# Patient Record
Sex: FEMALE | Race: OTHER | HISPANIC OR LATINO | URBAN - METROPOLITAN AREA
[De-identification: names, ages, dates, MRNs, and addresses within clinical notes are randomized per-mention and may not be internally consistent; named-entity substitution may affect disease eponyms.]

---

## 2022-05-17 ENCOUNTER — EMERGENCY (EMERGENCY)
Facility: HOSPITAL | Age: 52
LOS: 0 days | Discharge: HOME | End: 2022-05-18
Attending: EMERGENCY MEDICINE | Admitting: EMERGENCY MEDICINE
Payer: MEDICAID

## 2022-05-17 VITALS
DIASTOLIC BLOOD PRESSURE: 83 MMHG | TEMPERATURE: 98 F | RESPIRATION RATE: 18 BRPM | HEART RATE: 92 BPM | SYSTOLIC BLOOD PRESSURE: 136 MMHG | OXYGEN SATURATION: 98 % | WEIGHT: 138.01 LBS
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
## 2022-05-17 DIAGNOSIS — S62.614A DISPLACED FRACTURE OF PROXIMAL PHALANX OF RIGHT RING FINGER, INITIAL ENCOUNTER FOR CLOSED FRACTURE: ICD-10-CM

## 2022-05-17 DIAGNOSIS — S80.212A ABRASION, LEFT KNEE, INITIAL ENCOUNTER: ICD-10-CM
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
## 2022-05-17 DIAGNOSIS — M25.562 PAIN IN LEFT KNEE: ICD-10-CM

## 2022-05-17 DIAGNOSIS — M79.641 PAIN IN RIGHT HAND: ICD-10-CM

## 2022-05-17 DIAGNOSIS — S60.041A CONTUSION OF RIGHT RING FINGER WITHOUT DAMAGE TO NAIL, INITIAL ENCOUNTER: ICD-10-CM

## 2022-05-17 DIAGNOSIS — Z23 ENCOUNTER FOR IMMUNIZATION: ICD-10-CM
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
## 2022-05-17 DIAGNOSIS — S62.616A DISPLACED FRACTURE OF PROXIMAL PHALANX OF RIGHT LITTLE FINGER, INITIAL ENCOUNTER FOR CLOSED FRACTURE: ICD-10-CM
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
## 2022-05-17 DIAGNOSIS — S60.051A CONTUSION OF RIGHT LITTLE FINGER WITHOUT DAMAGE TO NAIL, INITIAL ENCOUNTER: ICD-10-CM
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
## 2022-05-17 PROCEDURE — 73130 X-RAY EXAM OF HAND: CPT | Mod: 26,RT

## 2022-05-17 PROCEDURE — 73562 X-RAY EXAM OF KNEE 3: CPT | Mod: 26,LT

## 2022-05-17 PROCEDURE — 26725 TREAT FINGER FRACTURE EACH: CPT | Mod: 54

## 2022-05-17 PROCEDURE — 99284 EMERGENCY DEPT VISIT MOD MDM: CPT | Mod: 57

## 2022-05-17 RX ORDER — OXYCODONE AND ACETAMINOPHEN 5; 325 MG/1; MG/1
1 TABLET ORAL ONCE
Refills: 0 | Status: DISCONTINUED | OUTPATIENT
Start: 2022-05-17 | End: 2022-05-17

## 2022-05-17 RX ORDER — TETANUS TOXOID, REDUCED DIPHTHERIA TOXOID AND ACELLULAR PERTUSSIS VACCINE, ADSORBED 5; 2.5; 8; 8; 2.5 [IU]/.5ML; [IU]/.5ML; UG/.5ML; UG/.5ML; UG/.5ML
0.5 SUSPENSION INTRAMUSCULAR ONCE
Refills: 0 | Status: COMPLETED | OUTPATIENT
Start: 2022-05-17 | End: 2022-05-17

## 2022-05-17 RX ORDER — IBUPROFEN 200 MG
600 TABLET ORAL ONCE
Refills: 0 | Status: COMPLETED | OUTPATIENT
Start: 2022-05-17 | End: 2022-05-17

## 2022-05-17 RX ADMIN — Medication 600 MILLIGRAM(S): at 21:34

## 2022-05-17 RX ADMIN — OXYCODONE AND ACETAMINOPHEN 1 TABLET(S): 5; 325 TABLET ORAL at 22:54

## 2022-05-17 RX ADMIN — TETANUS TOXOID, REDUCED DIPHTHERIA TOXOID AND ACELLULAR PERTUSSIS VACCINE, ADSORBED 0.5 MILLILITER(S): 5; 2.5; 8; 8; 2.5 SUSPENSION INTRAMUSCULAR at 21:30

## 2022-05-18 PROCEDURE — 73130 X-RAY EXAM OF HAND: CPT | Mod: 26,RT

## 2022-05-18 RX ORDER — CEPHALEXIN 500 MG
1 CAPSULE ORAL
Qty: 28 | Refills: 0
Start: 2022-05-18 | End: 2022-05-24

## 2022-05-18 RX ORDER — CEPHALEXIN 500 MG
500 CAPSULE ORAL ONCE
Refills: 0 | Status: COMPLETED | OUTPATIENT
Start: 2022-05-18 | End: 2022-05-18

## 2022-05-18 RX ORDER — IBUPROFEN 200 MG
1 TABLET ORAL
Qty: 12 | Refills: 0
Start: 2022-05-18 | End: 2022-05-21

## 2022-05-18 RX ADMIN — Medication 500 MILLIGRAM(S): at 01:08

## 2022-05-18 NOTE — ED PROVIDER NOTE - NS ED ROS FT
Constitutional: (-) fever, (-) chills  Eyes: (-) visual changes  Cardiovascular: (-) chest pain, (-) syncope  Respiratory: (-) cough, (-) shortness of breath, (-) dyspnea,   Gastrointestinal: (-) vomiting, (-) diarrhea, (-)nausea,  Musculoskeletal: (-) neck pain, (-) back pain, (+) joint pain,  Integumentary: (-) rash, (-) edema, (-) bruises  Neurological: (-) headache, (-) loc, (-) dizziness, (-) tingling, (-)numbness,  Peripheral Vascular: (-) leg swelling  Allergic/Immunologic: (-) pruritus

## 2022-05-18 NOTE — ED PROVIDER NOTE - PATIENT PORTAL LINK FT
You can access the FollowMyHealth Patient Portal offered by [facility] by registering at the following website: [email or web address]. By joining [facility]’s FollowMyHealth portal, you will also be able to view your health information using other applications (apps) compatible with our system.

## 2022-05-18 NOTE — ED PROVIDER NOTE - OBJECTIVE STATEMENT
50 y/o F with no pmhx presents to the ED s/p mechanical trip and fall. Pt states that she was walking her dogs, tripped on a  and landed on her outstretched hands. Denies any head injury, LOC or nausea/vision changes since. Pt currently in the ED c/o R hand pain and L knee pain. Denies any other injury. No chest pain, ABD pain, back pain or numbness/weakness/tingling in the extremities.

## 2022-05-18 NOTE — ED PROCEDURE NOTE - NS ED PERI VASCULAR NEG
Health Maintenance Due   Topic Date Due   • Breast Cancer Screening  03/11/2020       Patient is due for the topics as listed above and wishes to proceed with them. Orders placed for Mammogram.        
fingers/toes warm to touch/no paresthesia/no swelling/no cyanosis of extremity/capillary refill time < 2 seconds

## 2022-05-18 NOTE — ED PROVIDER NOTE - PHYSICAL EXAMINATION
Physical Exam  Vital Signs: I have reviewed the initial vital signs.  Constitutional: Appears uncomfortable in ED chair, NAD.   Eyes: Conjunctiva pink, Sclera clear  Cardiovascular: S1 and S2, regular rate, regular rhythm, well-perfused extremities, radial pulses equal and 2+  Respiratory: unlabored respiratory effort, clear to auscultation bilaterally no wheezing, rales and rhonchi  Gastrointestinal: soft, non-tender abdomen, no pulsatile mass, normal bowl sounds  Musculoskeletal: supple neck, (+) R hand tenderness with ecchymosis & swelling 4th and 5th bases, + deformity. (+) Superficial abrasion to L knee, no edema.   Integumentary: warm, dry, no rash  Neurologic: awake, alert, motor and sensory functions grossly intact  Psychiatric: appropriate mood, appropriate affect

## 2022-05-18 NOTE — ED PROVIDER NOTE - NSFOLLOWUPINSTRUCTIONS_ED_ALL_ED_FT
Follow up with hand/orthopedic clinic in 1-2 days     Finger Fracture    WHAT YOU NEED TO KNOW:    A finger fracture is a break in 1 or more of the bones in your finger.     DISCHARGE INSTRUCTIONS:    Return to the emergency department if:     Your cast or splint gets wet, damaged, or comes off.      Your splint or cast feels too tight.      You have severe pain.      Your injured finger is numb, cold, or pale.    Contact your healthcare provider or hand specialist if:     Your pain or swelling gets worse, even after treatment.      You have questions or concerns about your condition or care.    Medicines:     NSAIDs, such as ibuprofen, help decrease swelling, pain, and fever. This medicine is available with or without a doctor's order. NSAIDs can cause stomach bleeding or kidney problems in certain people. If you take blood thinner medicine, always ask your healthcare provider if NSAIDs are safe for you. Always read the medicine label and follow directions.      Acetaminophen decreases pain and fever. It is available without a doctor's order. Ask how much to take and how often to take it. Follow directions. Acetaminophen can cause liver damage if not taken correctly.      Prescription pain medicine may be given. Ask your healthcare provider how to take this medicine safely. Some prescription pain medicines contain acetaminophen. Do not take other medicines that contain acetaminophen without talking to your healthcare provider. Too much acetaminophen may cause liver damage. Prescription pain medicine may cause constipation. Ask your healthcare provider how to prevent or treat constipation.       Take your medicine as directed. Contact your healthcare provider if you think your medicine is not helping or if you have side effects. Tell him or her if you are allergic to any medicine. Keep a list of the medicines, vitamins, and herbs you take. Include the amounts, and when and why you take them. Bring the list or the pill bottles to follow-up visits. Carry your medicine list with you in case of an emergency.    Self-care:     Wear your splint as directed. Do not remove your splint until you follow up with your healthcare provider or hand specialist.       Apply ice on your finger for 15 to 20 minutes every hour or as directed. Use an ice pack, or put crushed ice in a plastic bag. Cover it with a towel before you apply it to your skin. Ice helps prevent tissue damage and decreases swelling and pain.      Elevate your finger above the level of your heart as often as you can. This will help decrease swelling and pain. Prop your hand on pillows or blankets to keep it elevated comfortably.       Go to physical therapy as directed. A physical therapist teaches you exercises to help improve movement and strength, and to decrease pain.     Follow up with your healthcare provider or hand specialist within 2 days: Write down your questions so you remember to ask them during your visits.        © Copyright [employer] 2019 All illustrations and images included in CareNotes are the copyrighted property of [employer] or [employer].    Contusion in Adults    WHAT YOU NEED TO KNOW:    A contusion is a bruise that appears on your skin after an injury. A bruise happens when small blood vessels tear but skin does not. When blood vessels tear, blood leaks into nearby tissue, such as soft tissue or muscle.    DISCHARGE INSTRUCTIONS:    Return to the emergency department if:     You have new trouble moving the injured area.      You have tingling or numbness in or near the injured area.      Your hand or foot below the bruise gets cold or turns pale.     Contact your healthcare provider if:     You find a new lump in the injured area.      Your symptoms do not improve with treatment after 4 to 5 days.      You have questions or concerns about your condition or care.    Medicines: You may need any of the following:     NSAIDs help decrease swelling and pain or fever. This medicine is available with or without a doctor's order. NSAIDs can cause stomach bleeding or kidney problems in certain people. If you take blood thinner medicine, always ask your healthcare provider if NSAIDs are safe for you. Always read the medicine label and follow directions.      Prescription pain medicine may be given. Do not wait until the pain is severe before you take your medicine.      Take your medicine as directed. Contact your healthcare provider if you think your medicine is not helping or if you have side effects. Tell him of her if you are allergic to any medicine. Keep a list of the medicines, vitamins, and herbs you take. Include the amounts, and when and why you take them. Bring the list or the pill bottles to follow-up visits. Carry your medicine list with you in case of an emergency.    Follow up with your healthcare provider as directed: You may need to return within a week to check your injury again. Write down your questions so you remember to ask them during your visits.    Help a contusion heal:     Rest the injured area or use it less than usual. If you bruised your leg or foot, you may need crutches or a cane to help you walk. This will help you keep weight off your injured body part.       Apply ice to decrease swelling and pain. Ice may also help prevent tissue damage. Use an ice pack, or put crushed ice in a plastic bag. Cover it with a towel and place it on your bruise for 15 to 20 minutes every hour or as directed.      Use compression to support the area and decrease swelling. Wrap an elastic bandage around the area over the bruised muscle. Make sure the bandage is not too tight. You should be able to fit 1 finger between the bandage and your skin.      Elevate (raise) your injured body part above the level of your heart to help decrease pain and swelling. Use pillows, blankets, or rolled towels to elevate the area as often as you can.      Do not drink alcohol as directed. Alcohol may slow healing.      Do not stretch injured muscles right after your injury. Ask your healthcare provider when and how you may safely stretch after your injury. Gentle stretches can help increase your flexibility.      Do not massage the area or put heating pads on the bruise right after your injury. Heat and massage may slow healing. Your healthcare provider may tell you to apply heat after several days. At that time, heat will start to help the injury heal.    Prevent another contusion:     Stretch and warm up before you play sports or exercise.      Wear protective gear when you play sports. Examples are shin guards and padding.       If you begin a new physical activity, start slowly to give your body a chance to adjust.         © Copyright [employer] 2019 All illustrations and images included in CareNotes are the copyrighted property of [email or web address] or [employer].       Abrasion  ImageAn abrasion is a cut or a scrape on the outer surface of the skin. An abrasion does not go through all the layers of the skin. It is important to care for your abrasion properly to prevent infection.    What are the causes?  This condition is caused by falling on or gliding across the ground or another surface. When your skin rubs on something, the outer and inner layers of skin may rub off.    What are the signs or symptoms?  The main symptom of this condition is a cut or a scrape. The scrape may be bleeding, or it may appear red or pink. If the abrasion was caused by a fall, there may be a bruise under the cut or scrape.    How is this diagnosed?  An abrasion is diagnosed with a physical exam.    How is this treated?  Treatment for this condition depends on how large and deep the abrasion is. In most cases:    Your abrasion will be cleaned with water and mild soap. This is done to remove any dirt or debris (such as particles of glass or rock) that may be stuck in the wound.  An antibiotic ointment may be applied to the abrasion to help prevent infection.  A bandage (dressing) may be placed on the abrasion to keep it clean.    You may also need a tetanus shot.    Follow these instructions at home:  Medicines     Take or apply over-the-counter and prescription medicines only as told by your health care provider.  If you were prescribed an antibiotic medicine, apply it as told by your health care provider.  Wound care     Clean the wound 2–3 times a day, or as directed by your health care provider. To do this, wash the wound with mild soap and water, rinse off the soap, and pat the wound dry with a clean towel. Do not rub the wound.  Keep the dressing clean and dry as told by your health care provider.  There are many different ways to close and cover a wound. Follow instructions from your health care provider about:    Caring for your wound.  Changing and removing your dressing. You may have to change your dressing one or more times a day, or as directed by your health care provider.    Check your wound every day for signs of infection. Check for:    Redness, particularly a red streak that spreads out from the wound.  Swelling or increased pain.  Warmth.  Fluid, pus, or a bad smell.    If directed, put ice on the injured area to reduce pain and swelling:    Put ice in a plastic bag.   Place a towel between your skin and the bag.   Leave the ice on for 20 minutes, 2–3 times a day.    General instructions     Do not take baths, swim, or use a hot tub until your health care provider says it is okay to do so.  If possible, raise (elevate) the injured area above the level of your heart while you are sitting or lying down. This will reduce pain and swelling.  Keep all follow-up visits as directed by your health care provider. This is important.  Contact a health care provider if:  You received a tetanus shot, and you have swelling, severe pain, redness, or bleeding at the injection site.  Your pain is not controlled with medicine.  You have redness, swelling, or more pain at the site of your wound.  Get help right away if:  You have a red streak spreading away from your wound.  You have a fever.  You have fluid, blood, or pus coming from your wound.  You notice a bad smell coming from your wound or your dressing.  Summary  An abrasion is a cut or a scrape on the outer surface of the skin. An abrasion does not go through all the layers of the skin.  Care for your abrasion properly to prevent infection.  Clean the wound with mild soap and water 2–3 times a day. Follow instructions from your health care provider about taking medicines and changing your bandage (dressing).  Contact your health care provider if you have redness, swelling or more pain in the wound area.  Get help right away if you have a fever or if you have fluid, blood, pus, a bad smell, or a red streak coming from the wound.  This information is not intended to replace advice given to you by your health care provider. Make sure you discuss any questions you have with your health care provider.

## 2022-05-18 NOTE — ED PROVIDER NOTE - NSFOLLOWUPCLINICS_GEN_ALL_ED_FT
[facility]  Hand  [address], [address]  [place] [address]  Phone: [phone]  Fax:     [facility]  Orthpedic  [address], [place]   Phone: [phone]  Fax:

## 2022-05-18 NOTE — ED PROVIDER NOTE - CLINICAL SUMMARY MEDICAL DECISION MAKING FREE TEXT BOX
51-year-old female right-hand-dominant presents status post trip and fall onto her hands and knees no close head injury ambulating afterwards patient with obvious deformity to right hand fourth and fifth digits MVI x-ray multiple fractures dislocation.  Digital block administered fracture reduced and splinted repeat x-ray with improved positioning of fracture patient was then she was follow-up with outpatient hand for hand surgery  Patient to be discharged from ED in well appearing condition. Any available test results were discussed with and printed  for patient.  Verbal instructions given, including instructions to return to ED immediately for any new, worsening, or concerning symptoms. Limitations of ED work up discussed.  Patient reports understanding of above with capacity and insight. Written discharge instructions additionally given, including follow-up plan.

## 2022-05-18 NOTE — ED PROVIDER NOTE - CARE PLAN
1 Principal Discharge DX:	Finger fracture  Secondary Diagnosis:	Knee contusion  Secondary Diagnosis:	Abrasion  Secondary Diagnosis:	Fall

## 2022-05-18 NOTE — ED PROVIDER NOTE - NS ED ATTENDING STATEMENT MOD
This was a shared visit with the ESTELLA. I reviewed and verified the documentation and independently performed the documented:

## 2022-06-02 ENCOUNTER — OUTPATIENT (OUTPATIENT)
Dept: OUTPATIENT SERVICES | Facility: HOSPITAL | Age: 52
LOS: 1 days | Discharge: HOME | End: 2022-06-02
Payer: SUBSIDIZED

## 2022-06-02 VITALS
DIASTOLIC BLOOD PRESSURE: 63 MMHG | HEIGHT: 60 IN | RESPIRATION RATE: 14 BRPM | HEART RATE: 64 BPM | OXYGEN SATURATION: 99 % | TEMPERATURE: 97 F | SYSTOLIC BLOOD PRESSURE: 113 MMHG | WEIGHT: 142.86 LBS

## 2022-06-02 DIAGNOSIS — Z01.818 ENCOUNTER FOR OTHER PREPROCEDURAL EXAMINATION: ICD-10-CM

## 2022-06-02 DIAGNOSIS — S62.616D DISPLACED FRACTURE OF PROXIMAL PHALANX OF RIGHT LITTLE FINGER, SUBSEQUENT ENCOUNTER FOR FRACTURE WITH ROUTINE HEALING: ICD-10-CM

## 2022-06-02 LAB
ALBUMIN SERPL ELPH-MCNC: 4.9 G/DL — SIGNIFICANT CHANGE UP (ref 3.5–5.2)
ALP SERPL-CCNC: 96 U/L — SIGNIFICANT CHANGE UP (ref 30–115)
ALT FLD-CCNC: 14 U/L — SIGNIFICANT CHANGE UP (ref 0–41)
ANION GAP SERPL CALC-SCNC: 15 MMOL/L — HIGH (ref 7–14)
APPEARANCE UR: CLEAR — SIGNIFICANT CHANGE UP
APTT BLD: 30.7 SEC — SIGNIFICANT CHANGE UP (ref 27–39.2)
AST SERPL-CCNC: 21 U/L — SIGNIFICANT CHANGE UP (ref 0–41)
BASOPHILS # BLD AUTO: 0.06 K/UL — SIGNIFICANT CHANGE UP (ref 0–0.2)
BASOPHILS NFR BLD AUTO: 0.7 % — SIGNIFICANT CHANGE UP (ref 0–1)
BILIRUB SERPL-MCNC: 0.3 MG/DL — SIGNIFICANT CHANGE UP (ref 0.2–1.2)
BILIRUB UR-MCNC: NEGATIVE — SIGNIFICANT CHANGE UP
BUN SERPL-MCNC: 17 MG/DL — SIGNIFICANT CHANGE UP (ref 10–20)
CALCIUM SERPL-MCNC: 9.6 MG/DL — SIGNIFICANT CHANGE UP (ref 8.5–10.1)
CHLORIDE SERPL-SCNC: 103 MMOL/L — SIGNIFICANT CHANGE UP (ref 98–110)
CO2 SERPL-SCNC: 25 MMOL/L — SIGNIFICANT CHANGE UP (ref 17–32)
COLOR SPEC: YELLOW — SIGNIFICANT CHANGE UP
CREAT SERPL-MCNC: 0.8 MG/DL — SIGNIFICANT CHANGE UP (ref 0.7–1.5)
DIFF PNL FLD: ABNORMAL
EGFR: 89 ML/MIN/1.73M2 — SIGNIFICANT CHANGE UP
EOSINOPHIL # BLD AUTO: 0.22 K/UL — SIGNIFICANT CHANGE UP (ref 0–0.7)
EOSINOPHIL NFR BLD AUTO: 2.6 % — SIGNIFICANT CHANGE UP (ref 0–8)
GLUCOSE SERPL-MCNC: 94 MG/DL — SIGNIFICANT CHANGE UP (ref 70–99)
GLUCOSE UR QL: NEGATIVE — SIGNIFICANT CHANGE UP
HCT VFR BLD CALC: 40.7 % — SIGNIFICANT CHANGE UP (ref 37–47)
HGB BLD-MCNC: 13.5 G/DL — SIGNIFICANT CHANGE UP (ref 12–16)
IMM GRANULOCYTES NFR BLD AUTO: 0.2 % — SIGNIFICANT CHANGE UP (ref 0.1–0.3)
INR BLD: 0.98 RATIO — SIGNIFICANT CHANGE UP (ref 0.65–1.3)
KETONES UR-MCNC: SIGNIFICANT CHANGE UP
LEUKOCYTE ESTERASE UR-ACNC: ABNORMAL
LYMPHOCYTES # BLD AUTO: 3.52 K/UL — HIGH (ref 1.2–3.4)
LYMPHOCYTES # BLD AUTO: 41.8 % — SIGNIFICANT CHANGE UP (ref 20.5–51.1)
MCHC RBC-ENTMCNC: 29.2 PG — SIGNIFICANT CHANGE UP (ref 27–31)
MCHC RBC-ENTMCNC: 33.2 G/DL — SIGNIFICANT CHANGE UP (ref 32–37)
MCV RBC AUTO: 87.9 FL — SIGNIFICANT CHANGE UP (ref 81–99)
MONOCYTES # BLD AUTO: 0.58 K/UL — SIGNIFICANT CHANGE UP (ref 0.1–0.6)
MONOCYTES NFR BLD AUTO: 6.9 % — SIGNIFICANT CHANGE UP (ref 1.7–9.3)
NEUTROPHILS # BLD AUTO: 4.03 K/UL — SIGNIFICANT CHANGE UP (ref 1.4–6.5)
NEUTROPHILS NFR BLD AUTO: 47.8 % — SIGNIFICANT CHANGE UP (ref 42.2–75.2)
NITRITE UR-MCNC: NEGATIVE — SIGNIFICANT CHANGE UP
NRBC # BLD: 0 /100 WBCS — SIGNIFICANT CHANGE UP (ref 0–0)
PH UR: 6.5 — SIGNIFICANT CHANGE UP (ref 5–8)
PLATELET # BLD AUTO: 332 K/UL — SIGNIFICANT CHANGE UP (ref 130–400)
POTASSIUM SERPL-MCNC: 3.7 MMOL/L — SIGNIFICANT CHANGE UP (ref 3.5–5)
POTASSIUM SERPL-SCNC: 3.7 MMOL/L — SIGNIFICANT CHANGE UP (ref 3.5–5)
PROT SERPL-MCNC: 7.5 G/DL — SIGNIFICANT CHANGE UP (ref 6–8)
PROT UR-MCNC: SIGNIFICANT CHANGE UP
PROTHROM AB SERPL-ACNC: 11.3 SEC — SIGNIFICANT CHANGE UP (ref 9.95–12.87)
RBC # BLD: 4.63 M/UL — SIGNIFICANT CHANGE UP (ref 4.2–5.4)
RBC # FLD: 13.1 % — SIGNIFICANT CHANGE UP (ref 11.5–14.5)
SODIUM SERPL-SCNC: 143 MMOL/L — SIGNIFICANT CHANGE UP (ref 135–146)
SP GR SPEC: 1.02 — SIGNIFICANT CHANGE UP (ref 1.01–1.03)
UROBILINOGEN FLD QL: SIGNIFICANT CHANGE UP
WBC # BLD: 8.43 K/UL — SIGNIFICANT CHANGE UP (ref 4.8–10.8)
WBC # FLD AUTO: 8.43 K/UL — SIGNIFICANT CHANGE UP (ref 4.8–10.8)

## 2022-06-02 PROCEDURE — 93010 ELECTROCARDIOGRAM REPORT: CPT

## 2022-06-02 NOTE — H&P PST ADULT - HISTORY OF PRESENT ILLNESS
PT PRESENTS TO PAST WITH NO SOB, CP, PALPITATIONS, DYSURIA, UTI OR URI AT PRESENT.   PT ABLE TO WALK UP 2-3 FLIGHTS OF STEPS WITH NO SOB.  AS PER THE PT, THIS IS HIS/HER COMPLETE MEDICAL AND SURGICAL HX, INCLUDING MEDICATIONS PRESCRIBED AND OVER THE COUNTER  pt denies any covid s/s, or tested positive in the past--PT AWARE OF DATE AND TIME OF COVID TESTING PRIOR TO SURGERY  pt advised self quarantine till day of procedure  denies travel outside the [place] in the past 30 days  Anesthesia Alert  NO--Difficult Airway  NO--History of neck surgery or radiation  NO--Limited ROM of neck  NO--History of Malignant hyperthermia  NO--Personal or family history of Pseudocholinesterase deficiency  NO--Prior Anesthesia Complication  NO--Latex Allergy  NO--Loose teeth  NO--History of Rheumatoid Arthritis  NO--JAMIR  NO BLEEDING RISK  NO--Other_____

## 2022-06-02 NOTE — H&P PST ADULT - EXTREMITIES COMMENTS
RIGHT HAND CASTED FROM TIP OF FINGERS TO ABOVE WRIST-PT ABLE TO MOVE FINGERS FREELY. FINGERS PINK WARM TO TOUCH.

## 2022-06-02 NOTE — H&P PST ADULT - REASON FOR ADMISSION
Proceduralist: Jaida White  Procedure: CLOSED REDUCTION PERCUTANEUS PINNING POSSIBLE OPEN REDUCTION INTERNAL FIXATION RIGHT HAND FOURTH AND FIFTH PROXIMAL PHALANX FRACTURES  Length of Procedure: 90 Minutes  PT REPORTS--I FELL ON 5/17/22 AND BROKE MY FINGERS ON MY RIGHT HAND.   I HAVE PAIN 4/10.   ITS A THROBBING, PRESSURE AND ACHING PAIN.  NOTHING REALLY TAKES AWAY THE PAIN.

## 2022-06-02 NOTE — H&P PST ADULT - NSANTHOSAYNRD_GEN_A_CORE
No. JAMIR screening performed.  STOP BANG Legend: 0-2 = LOW Risk; 3-4 = INTERMEDIATE Risk; 5-8 = HIGH Risk

## 2022-06-03 LAB
BACTERIA # UR AUTO: NEGATIVE — SIGNIFICANT CHANGE UP
EPI CELLS # UR: 13 /HPF — HIGH (ref 0–5)
HYALINE CASTS # UR AUTO: 20 /LPF — HIGH (ref 0–7)
RBC CASTS # UR COMP ASSIST: 2 /HPF — SIGNIFICANT CHANGE UP (ref 0–4)
WBC UR QL: 6 /HPF — HIGH (ref 0–5)

## 2022-06-06 ENCOUNTER — LABORATORY RESULT (OUTPATIENT)
Age: 52
End: 2022-06-06

## 2022-06-07 PROBLEM — Z00.00 ENCOUNTER FOR PREVENTIVE HEALTH EXAMINATION: Status: ACTIVE | Noted: 2022-06-07

## 2022-06-09 ENCOUNTER — TRANSCRIPTION ENCOUNTER (OUTPATIENT)
Age: 52
End: 2022-06-09

## 2022-06-09 ENCOUNTER — OUTPATIENT (OUTPATIENT)
Dept: OUTPATIENT SERVICES | Facility: HOSPITAL | Age: 52
LOS: 1 days | Discharge: HOME | End: 2022-06-09

## 2022-06-09 VITALS
HEIGHT: 60 IN | OXYGEN SATURATION: 100 % | DIASTOLIC BLOOD PRESSURE: 60 MMHG | HEART RATE: 83 BPM | TEMPERATURE: 98 F | SYSTOLIC BLOOD PRESSURE: 119 MMHG | WEIGHT: 142.86 LBS | RESPIRATION RATE: 20 BRPM

## 2022-06-09 VITALS
DIASTOLIC BLOOD PRESSURE: 57 MMHG | RESPIRATION RATE: 17 BRPM | OXYGEN SATURATION: 100 % | HEART RATE: 74 BPM | SYSTOLIC BLOOD PRESSURE: 117 MMHG

## 2022-06-09 PROBLEM — J45.909 UNSPECIFIED ASTHMA, UNCOMPLICATED: Chronic | Status: ACTIVE | Noted: 2022-06-02

## 2022-06-09 PROBLEM — Z86.69 PERSONAL HISTORY OF OTHER DISEASES OF THE NERVOUS SYSTEM AND SENSE ORGANS: Chronic | Status: ACTIVE | Noted: 2022-06-02

## 2022-06-09 RX ORDER — ONDANSETRON 8 MG/1
4 TABLET, FILM COATED ORAL ONCE
Refills: 0 | Status: DISCONTINUED | OUTPATIENT
Start: 2022-06-09 | End: 2022-06-23

## 2022-06-09 RX ORDER — MORPHINE SULFATE 50 MG/1
4 CAPSULE, EXTENDED RELEASE ORAL
Refills: 0 | Status: DISCONTINUED | OUTPATIENT
Start: 2022-06-09 | End: 2022-06-09

## 2022-06-09 RX ORDER — OXYCODONE AND ACETAMINOPHEN 5; 325 MG/1; MG/1
1 TABLET ORAL ONCE
Refills: 0 | Status: DISCONTINUED | OUTPATIENT
Start: 2022-06-09 | End: 2022-06-09

## 2022-06-09 RX ORDER — MORPHINE SULFATE 50 MG/1
2 CAPSULE, EXTENDED RELEASE ORAL
Refills: 0 | Status: DISCONTINUED | OUTPATIENT
Start: 2022-06-09 | End: 2022-06-09

## 2022-06-09 RX ORDER — MEPERIDINE HYDROCHLORIDE 50 MG/ML
12.5 INJECTION INTRAMUSCULAR; INTRAVENOUS; SUBCUTANEOUS ONCE
Refills: 0 | Status: DISCONTINUED | OUTPATIENT
Start: 2022-06-09 | End: 2022-06-09

## 2022-06-09 RX ORDER — CETIRIZINE HYDROCHLORIDE 10 MG/1
1 TABLET ORAL
Qty: 0 | Refills: 0 | DISCHARGE

## 2022-06-09 RX ORDER — IBUPROFEN 200 MG
1 TABLET ORAL
Qty: 0 | Refills: 0 | DISCHARGE

## 2022-06-09 RX ORDER — SODIUM CHLORIDE 9 MG/ML
1000 INJECTION, SOLUTION INTRAVENOUS
Refills: 0 | Status: DISCONTINUED | OUTPATIENT
Start: 2022-06-09 | End: 2022-06-23

## 2022-06-09 RX ADMIN — OXYCODONE AND ACETAMINOPHEN 1 TABLET(S): 5; 325 TABLET ORAL at 13:53

## 2022-06-09 RX ADMIN — SODIUM CHLORIDE 100 MILLILITER(S): 9 INJECTION, SOLUTION INTRAVENOUS at 13:19

## 2022-06-09 NOTE — CHART NOTE - NSCHARTNOTEFT_GEN_A_CORE
PACU ANESTHESIA ADMISSION NOTE      Procedure: Percutaneous pinning of finger  x2      Post op diagnosis:  Fracture of proximal phalanx of right little finger    Fracture of proximal phalanx of right ring finger        ____  Intubated  TV:______       Rate: ______      FiO2: ______    _x___  Patent Airway    __x__  Full return of protective reflexes    __x__  Full recovery from anesthesia / back to baseline status    Vitals:  temp(F) 98  BP  100/58  spo2 98  RR 16  pulse 60    Mental Status:  _x ___ Awake   _____ Alert   _____ Drowsy   _____ Sedated    Nausea/Vomiting:  __x __ NO  ______Yes,   See Post - Op Orders          Pain Scale (0-10):  _____    Treatment: ____ None    x ____ See Post - Op/PCA Orders    Post - Operative Fluids:   ____ Oral   __x __ See Post - Op Orders    Plan: Discharge:   __x __Home       _____Floor     _____Critical Care    _____  Other:_________________    Comments: uneventful anesthesia course no complications. Vitals stable. Pt transferred to PACU

## 2022-06-09 NOTE — ASU DISCHARGE PLAN (ADULT/PEDIATRIC) - NS MD DC FALL RISK RISK
For information on Fall & Injury Prevention, visit: [email or web address] OR  [email or web address] OR  https://www.cdc.gov/steadi/patient.html

## 2022-06-09 NOTE — BRIEF OPERATIVE NOTE - NSICDXBRIEFPREOP_GEN_ALL_CORE_FT
PRE-OP DIAGNOSIS:  Fracture of proximal phalanx of right little finger 09-Jun-2022 13:02:10  Jaida White  Fracture of proximal phalanx of right ring finger 09-Jun-2022 13:01:58  Jaida White

## 2022-06-09 NOTE — BRIEF OPERATIVE NOTE - NSICDXBRIEFPOSTOP_GEN_ALL_CORE_FT
POST-OP DIAGNOSIS:  Fracture of proximal phalanx of right ring finger 09-Jun-2022 13:02:22  Jaida White  Fracture of proximal phalanx of right little finger 09-Jun-2022 13:02:15  Jaida White

## 2022-06-14 DIAGNOSIS — S62.616A DISPLACED FRACTURE OF PROXIMAL PHALANX OF RIGHT LITTLE FINGER, INITIAL ENCOUNTER FOR CLOSED FRACTURE: ICD-10-CM
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
## 2022-06-14 DIAGNOSIS — J45.909 UNSPECIFIED ASTHMA, UNCOMPLICATED: ICD-10-CM

## 2022-06-14 DIAGNOSIS — G43.909 MIGRAINE, UNSPECIFIED, NOT INTRACTABLE, WITHOUT STATUS MIGRAINOSUS: ICD-10-CM

## 2022-06-14 DIAGNOSIS — S62.614A DISPLACED FRACTURE OF PROXIMAL PHALANX OF RIGHT RING FINGER, INITIAL ENCOUNTER FOR CLOSED FRACTURE: ICD-10-CM
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
## 2022-06-21 ENCOUNTER — APPOINTMENT (OUTPATIENT)
Dept: ORTHOPEDIC SURGERY | Facility: CLINIC | Age: 52
End: 2022-06-21

## 2022-06-29 ENCOUNTER — APPOINTMENT (OUTPATIENT)
Dept: ORTHOPEDIC SURGERY | Facility: CLINIC | Age: 52
End: 2022-06-29
Payer: SELF-PAY

## 2022-06-29 DIAGNOSIS — Z78.9 OTHER SPECIFIED HEALTH STATUS: ICD-10-CM

## 2022-06-29 PROCEDURE — 20670 REMOVAL IMPLANT SUPERFICIAL: CPT

## 2022-06-29 PROCEDURE — 99024 POSTOP FOLLOW-UP VISIT: CPT

## 2022-06-29 PROCEDURE — 73140 X-RAY EXAM OF FINGER(S): CPT | Mod: RT

## 2022-06-29 NOTE — ASSESSMENT
[FreeTextEntry1] :  status post CR PP right 4th and 5th proximal phalanx fractures.  the pins were removed in the office, dressing was placed.  I am recommending the patient do therapy.  I discussed with her at her last visit.  She is also going to work on range of motion at her own.  I discussed with her pin site care as well.  I will see her back in about 4 weeks.

## 2022-06-29 NOTE — HISTORY OF PRESENT ILLNESS
[] : Post Surgical Visit: yes [de-identified] : The patient is here status post CR PP right 4th and 5th proximal phalanx fractures.  She is doing relatively well.  The cast was removed today.  She is here for pin removal. [de-identified] : 06/09/2022 [de-identified] : status post CRPP the right ring and small finger proximal phalanx fractures

## 2022-06-29 NOTE — PHYSICAL EXAM
[de-identified] :   Right hand;  pin sites clean dry and intact, removed, swelling of hand, decreased range of motion of  All fingers, neurovascularly intact

## 2022-07-14 RX ORDER — OXYCODONE AND ACETAMINOPHEN 5; 325 MG/1; MG/1
5-325 TABLET ORAL
Qty: 20 | Refills: 0 | Status: ACTIVE | COMMUNITY
Start: 2022-07-14 | End: 1900-01-01

## 2022-08-02 ENCOUNTER — APPOINTMENT (OUTPATIENT)
Dept: ORTHOPEDIC SURGERY | Facility: CLINIC | Age: 52
End: 2022-08-02

## 2022-08-02 PROCEDURE — 99024 POSTOP FOLLOW-UP VISIT: CPT

## 2022-08-02 PROCEDURE — 73140 X-RAY EXAM OF FINGER(S): CPT | Mod: RT

## 2022-08-02 NOTE — HISTORY OF PRESENT ILLNESS
[de-identified] : Patient comes in status post CR PP the right ring finger and small finger proximal phalanx.  She has significantly decreased range of motion.  She is currently go to therapy.  She does not have pain.

## 2022-08-02 NOTE — ASSESSMENT
[FreeTextEntry1] :  I discussed with the patient that the fingers are now healed however the main problem is that she has decreased range of motion.  The mainstay of treatment now is therapy.  It is very important that she work to get the range of motion back.  She needs to push herself past her point of comfort.  She needs to do this on her own as well as in therapy.  She is self pay.  She would like to return to see me in her postop phase so she will return again but we will not need new x-rays.  After that she will just continue with therapy in only return to see me when she needs new prescriptions.  She was given a prescription today.  I am hoping she will gain more motion so she does not need joint releases.

## 2022-08-02 NOTE — IMAGING
[de-identified] :   Right hand:  nontender palpation over any wear along the fingers, decreased range of motion of all of her fingers MP PIP DIP joints, neurovascularly intact

## 2022-09-02 ENCOUNTER — APPOINTMENT (OUTPATIENT)
Dept: ORTHOPEDIC SURGERY | Facility: CLINIC | Age: 52
End: 2022-09-02

## 2022-09-02 DIAGNOSIS — S62.616D DISPLACED FRACTURE OF PROXIMAL PHALANX OF RIGHT LITTLE FINGER, SUBSEQUENT ENCOUNTER FOR FRACTURE WITH ROUTINE HEALING: ICD-10-CM

## 2022-09-02 DIAGNOSIS — S62.614D DISPLACED FRACTURE OF PROXIMAL PHALANX OF RIGHT RING FINGER, SUBSEQUENT ENCOUNTER FOR FRACTURE WITH ROUTINE HEALING: ICD-10-CM

## 2022-09-02 PROCEDURE — 99024 POSTOP FOLLOW-UP VISIT: CPT

## 2022-09-02 NOTE — ASSESSMENT
[FreeTextEntry1] :  the patient is doing relatively well with therapy has been making progress.  Discussed with the patient she needs to work to get better flexion.  She is going to continue with therapy and we gave her a new prescription for therapy.  She will follow up in about 2 months.  We will not need to get new films.

## 2022-09-02 NOTE — PHYSICAL EXAM
[de-identified] : Significant stiffness of ring finger and small finger MP joints PIP and DIP joints, most decrease it MP joint with about 30¦ of range of motion, able to flex PIP joint to 90°, DIP joints to 60°, neurovascularly intact

## 2022-09-02 NOTE — HISTORY OF PRESENT ILLNESS
[] : Post Surgical Visit: yes [de-identified] : Patient comes in status post CR PP the right ring finger and small finger proximal phalanx.  She has significantly decreased range of motion.  She is currently go to therapy.  She does not have pain. [de-identified] : 06/09/2022 [de-identified] : CR PP right 4th and 5th proximal phalanx fractures

## 2022-09-02 NOTE — IMAGING
[de-identified] :   Right hand:  nontender palpation over any wear along the fingers, decreased range of motion of all of her fingers MP PIP DIP joints, neurovascularly intact

## 2022-12-02 ENCOUNTER — APPOINTMENT (OUTPATIENT)
Dept: ORTHOPEDIC SURGERY | Facility: CLINIC | Age: 52
End: 2022-12-02

## 2022-12-21 ENCOUNTER — OUTPATIENT (OUTPATIENT)
Dept: OUTPATIENT SERVICES | Facility: HOSPITAL | Age: 52
LOS: 1 days | Discharge: HOME | End: 2022-12-21

## 2022-12-21 DIAGNOSIS — S62.616D DISPLACED FRACTURE OF PROXIMAL PHALANX OF RIGHT LITTLE FINGER, SUBSEQUENT ENCOUNTER FOR FRACTURE WITH ROUTINE HEALING: ICD-10-CM

## 2022-12-21 DIAGNOSIS — S62.614D DISPLACED FRACTURE OF PROXIMAL PHALANX OF RIGHT RING FINGER, SUBSEQUENT ENCOUNTER FOR FRACTURE WITH ROUTINE HEALING: ICD-10-CM

## 2023-02-01 ENCOUNTER — OUTPATIENT (OUTPATIENT)
Dept: OUTPATIENT SERVICES | Facility: HOSPITAL | Age: 53
LOS: 1 days | End: 2023-02-01

## 2023-02-01 DIAGNOSIS — S62.614D DISPLACED FRACTURE OF PROXIMAL PHALANX OF RIGHT RING FINGER, SUBSEQUENT ENCOUNTER FOR FRACTURE WITH ROUTINE HEALING: ICD-10-CM

## 2023-03-17 ENCOUNTER — APPOINTMENT (OUTPATIENT)
Dept: ORTHOPEDIC SURGERY | Facility: CLINIC | Age: 53
End: 2023-03-17

## 2024-12-02 NOTE — H&P PST ADULT - PRO INTERPRETER NEED 2
